# Patient Record
(demographics unavailable — no encounter records)

---

## 2025-06-17 NOTE — PHYSICAL EXAM
[Right] : right knee [] : no erythema [TWNoteComboBox7] : flexion 80 degrees [de-identified] : extension 5 degrees

## 2025-06-17 NOTE — HISTORY OF PRESENT ILLNESS
[de-identified] : Patient is a 51-year-old female here to evaluate right knee pain. Patient states pain is not related to any specific injury. Patient notes she started experiencing pain after sitting in the car for 10 minutes after going for a walk on a trail last week. Patient notes sharp, throbbing pain that occasionally radiates down the leg into the shin. Patient rates the pain a 9 out of 10. Patient notes pain is most prevalent when walking. Patient notes she can't bend the knee back when the leg is straightened for prolonged periods. Patient notes occasional swelling in the knee. Patient notes difficulty walking Patient uses Advil as needed. Patient notes no prior injury to the knee.

## 2025-06-17 NOTE — PHYSICAL EXAM
[Right] : right knee [] : no erythema [TWNoteComboBox7] : flexion 80 degrees [de-identified] : extension 5 degrees

## 2025-06-17 NOTE — HISTORY OF PRESENT ILLNESS
[de-identified] : Patient is a 51-year-old female here to evaluate right knee pain. Patient states pain is not related to any specific injury. Patient notes she started experiencing pain after sitting in the car for 10 minutes after going for a walk on a trail last week. Patient notes sharp, throbbing pain that occasionally radiates down the leg into the shin. Patient rates the pain a 9 out of 10. Patient notes pain is most prevalent when walking. Patient notes she can't bend the knee back when the leg is straightened for prolonged periods. Patient notes occasional swelling in the knee. Patient notes difficulty walking Patient uses Advil as needed. Patient notes no prior injury to the knee.

## 2025-06-17 NOTE — DISCUSSION/SUMMARY
[de-identified] : Tests/Studies Independently Interpreted Today    X-rays of right knee were obtained and reviewed on 06/10/2025 . 4 views were obtained including AP, lateral, sunrise and notch . I independently reviewed the films and the clinically relevant findings include calcified meniscus on both sides, slight patellofemoral degenerative changes. ______________________________________________________________________________________________ Meniscal tear vs early arthritis upon physical exam, discussed risks of potential meniscal surgery and risks of operative and non-operative treatment of arthritis. We will obtain an MRI to see if surgery is necessary and guide future treatment. In the interim, we discussed appropriate use of NSAIDs and side effects. Recommended rehab and discussed risks and benefits of possible injection.  Evaluated the patients right knee and decided to proceed with 9/2 right knee Celestone injection under u/s with aspiration. The risks, benefits and contents of the injection have been discussed. Risks include but are not limited to allergic reaction, flare reaction, permanent white skin discoloration at the injection site and infection.  The patient understands the risks and agrees to having the injection.  All questions have been answered. Discussed the timing of the injections and the follow up that is needed. Advised the patient to ice the area that was injected and that it may take a few days for the injection to provide relief. Discussed risks of potential surgery. However, due to the risks of the surgery, we will try NSAIDs and therapy. Discussed management of medication. Aspirated 30cc of cloudy fluid. Considering this, we will send fluids out to the lab to test for lime, gram stain, cell count, and uric acid  The patient will begin use of Hinged knee brace to ensure functional stability - fitted and dispensed in office today.  Prescribed patient Meloxicam 15mg daily and discussed risks of side effects, as well as timing/management of medication.  Side effects can include but are not limited to gastrointestinal ulcers and irritation, kidney failure, and bleeding issues. Use as directed and take with food to manage pain, inflammation, and discomfort.  Follow up after MRI

## 2025-06-17 NOTE — DISCUSSION/SUMMARY
[de-identified] : Tests/Studies Independently Interpreted Today    X-rays of right knee were obtained and reviewed on 06/10/2025 . 4 views were obtained including AP, lateral, sunrise and notch . I independently reviewed the films and the clinically relevant findings include calcified meniscus on both sides, slight patellofemoral degenerative changes. ______________________________________________________________________________________________ Meniscal tear vs early arthritis upon physical exam, discussed risks of potential meniscal surgery and risks of operative and non-operative treatment of arthritis. We will obtain an MRI to see if surgery is necessary and guide future treatment. In the interim, we discussed appropriate use of NSAIDs and side effects. Recommended rehab and discussed risks and benefits of possible injection.  Evaluated the patients right knee and decided to proceed with 9/2 right knee Celestone injection under u/s with aspiration. The risks, benefits and contents of the injection have been discussed. Risks include but are not limited to allergic reaction, flare reaction, permanent white skin discoloration at the injection site and infection.  The patient understands the risks and agrees to having the injection.  All questions have been answered. Discussed the timing of the injections and the follow up that is needed. Advised the patient to ice the area that was injected and that it may take a few days for the injection to provide relief. Discussed risks of potential surgery. However, due to the risks of the surgery, we will try NSAIDs and therapy. Discussed management of medication. Aspirated 30cc of cloudy fluid. Considering this, we will send fluids out to the lab to test for lime, gram stain, cell count, and uric acid  The patient will begin use of Hinged knee brace to ensure functional stability - fitted and dispensed in office today.  Prescribed patient Meloxicam 15mg daily and discussed risks of side effects, as well as timing/management of medication.  Side effects can include but are not limited to gastrointestinal ulcers and irritation, kidney failure, and bleeding issues. Use as directed and take with food to manage pain, inflammation, and discomfort.  Follow up after MRI

## 2025-06-17 NOTE — PROCEDURE
[FreeTextEntry3] : Procedure Note: Musculoskeletal Injection   Diagnosis:   Procedure: right knee superolateral injection  Indication:  The patient has had persistent pain despite conservative treatment.  Risks, benefits and alternatives to procedure were discussed; all questions were answered to the patient's apparent satisfaction and informed consent obtained.  The patient denied prior problems with local anesthetics, injectable cortisones, chicken allergy, coagulopathy and no relevant drug or preservative allergies or sensitivities.  The area of injection was prepared in a sterile fashion.  Prior to injection a 'Time Out' was conducted in accordance with [facility] policy and the site and nature of procedure verified with the patient.   Procedure:   The procedure was carried out utilizing sterile technique from a superolateral arthroscopic portal position.  The needle was placed under ultrasound guidance to improve accuracy and minimize risk to the patient and diagnostic ultrasound in the long and short axis revealed calcified meniscus.    Ultrasound Indication: Obesity   30cc of cloudy fluid was aspirated.   The specimen:   ( ) appeared benign and was discarded  (X) was sent for Cell Count / Gram Stain / Lime / Uric Acid.      Injection into the target area with care taken to aspirate frequently to minimize the risk of intravascular injection was performed with:  ( ) 1cc of Depomedrol (80mg/ml) (X) 2cc of Betamethasone (Celestone) (6mg/ml) ( ) 1cc of Toradol (30mg/ml) (X) 9cc of 0.5% Bupivacaine ( ) 1cc of 1% Lidocaine   Patient tolerated the procedure well and direct pressure was applied for hemostasis. The patient was reminded of potential post-injection risks including, but not limited to, delayed hypersensitivity reactions and/or infection.  The patient verified that they had the office and the Emergency Room's contact information if any problems should arise.  After several minutes, the patient informed me that they felt fine and was released from the office.

## 2025-06-17 NOTE — DISCUSSION/SUMMARY
[de-identified] : Tests/Studies Independently Interpreted Today    X-rays of right knee were obtained and reviewed on 06/10/2025 . 4 views were obtained including AP, lateral, sunrise and notch . I independently reviewed the films and the clinically relevant findings include calcified meniscus on both sides, slight patellofemoral degenerative changes. ______________________________________________________________________________________________ Meniscal tear vs early arthritis upon physical exam, discussed risks of potential meniscal surgery and risks of operative and non-operative treatment of arthritis. We will obtain an MRI to see if surgery is necessary and guide future treatment. In the interim, we discussed appropriate use of NSAIDs and side effects. Recommended rehab and discussed risks and benefits of possible injection.  Evaluated the patients right knee and decided to proceed with 9/2 right knee Celestone injection under u/s with aspiration. The risks, benefits and contents of the injection have been discussed. Risks include but are not limited to allergic reaction, flare reaction, permanent white skin discoloration at the injection site and infection.  The patient understands the risks and agrees to having the injection.  All questions have been answered. Discussed the timing of the injections and the follow up that is needed. Advised the patient to ice the area that was injected and that it may take a few days for the injection to provide relief. Discussed risks of potential surgery. However, due to the risks of the surgery, we will try NSAIDs and therapy. Discussed management of medication. Aspirated 30cc of cloudy fluid. Considering this, we will send fluids out to the lab to test for lime, gram stain, cell count, and uric acid  The patient will begin use of Hinged knee brace to ensure functional stability - fitted and dispensed in office today.  Prescribed patient Meloxicam 15mg daily and discussed risks of side effects, as well as timing/management of medication.  Side effects can include but are not limited to gastrointestinal ulcers and irritation, kidney failure, and bleeding issues. Use as directed and take with food to manage pain, inflammation, and discomfort.  Follow up after MRI

## 2025-06-17 NOTE — PROCEDURE
[FreeTextEntry3] : Procedure Note: Musculoskeletal Injection   Diagnosis:   Procedure: right knee superolateral injection  Indication:  The patient has had persistent pain despite conservative treatment.  Risks, benefits and alternatives to procedure were discussed; all questions were answered to the patient's apparent satisfaction and informed consent obtained.  The patient denied prior problems with local anesthetics, injectable cortisones, chicken allergy, coagulopathy and no relevant drug or preservative allergies or sensitivities.  The area of injection was prepared in a sterile fashion.  Prior to injection a 'Time Out' was conducted in accordance with [facility] policy and the site and nature of procedure verified with the patient.   Procedure:   The procedure was carried out utilizing sterile technique from a superolateral arthroscopic portal position.  The needle was placed under ultrasound guidance to improve accuracy and minimize risk to the patient and diagnostic ultrasound in the long and short axis revealed calcified meniscus.    Ultrasound Indication: Obesity   30cc of cloudy fluid was aspirated.   The specimen:   ( ) appeared benign and was discarded  (X) was sent for Cell Count / Gram Stain / Lime / Uric Acid.      Injection into the target area with care taken to aspirate frequently to minimize the risk of intravascular injection was performed with:  ( ) 1cc of Depomedrol (80mg/ml) (X) 2cc of Betamethasone (Celestone) (6mg/ml) ( ) 1cc of Toradol (30mg/ml) (X) 9cc of 0.5% Bupivacaine ( ) 1cc of 1% Lidocaine   Patient tolerated the procedure well and direct pressure was applied for hemostasis. The patient was reminded of potential post-injection risks including, but not limited to, delayed hypersensitivity reactions and/or infection.  The patient verified that they had the office and the Emergency Room's contact information if any problems should arise.  After several minutes, the patient informed me that they felt fine and was released from the office.

## 2025-06-17 NOTE — PHYSICAL EXAM
[Right] : right knee [] : no erythema [TWNoteComboBox7] : flexion 80 degrees [de-identified] : extension 5 degrees

## 2025-06-25 NOTE — DATA REVIEWED
[MRI] : MRI [Right] : of the right [Knee] : knee [Report was reviewed and noted in the chart] : The report was reviewed and noted in the chart [I independently reviewed and interpreted images and report] : I independently reviewed and interpreted images and report [I reviewed the films/CD] : I reviewed the films/CD [FreeTextEntry1] :  free edge tear of the lateral meniscus

## 2025-06-25 NOTE — HISTORY OF PRESENT ILLNESS
[de-identified] : 06/17/25: patient is here for follow up right knee pain. has MRI done. pain has improved after CSI last visit. pain is 2/10. [FreeTextEntry1] : Right knee

## 2025-06-25 NOTE — DISCUSSION/SUMMARY
[de-identified] : The patient's condition is acute Confounding medical conditions/concerns: Tests/Studies Independently Interpreted Today We reviewed MRI findings of the R knee and found slight free edge tear of the lateral meniscus  ------------------------------------------------------------------------------------------------------------------  We reviewed the mri findings of the R knee and discussed treatment options, both operative and non operative. Discussed risks of potential surgery. However, due to the risks of the surgery, we will try NSAIDs and therapy. Discussed management of medication. Surgery has been suggested a finite treatment. The risks and benefits of surgery have been discussed. Risks include but are not limited to bleeding, infection, reaction to anesthesia, injury to blood vessels and nerves, malunion, nonunion, DVT, PE, necessity of repeat surgery, chronic pain, loss of limb and death. The patient understands the risks and has chosen to proceed with conservative care. All questions have been answered.  Prescribed patient Meloxicam 15mg once daily and discussed risks of side effects, as well as timing/management of medication.  Side effects can include but are not limited to gastrointestinal ulcers and irritation, kidney failure, and bleeding issues. Use as directed and take with food to manage pain, inflammation, and discomfort.  Reviewed lab results and found calcium crystals in fluid, lime results were negative, fluid results were negative. Advised the patient this is likely pseudogout. Advised her to treat with antiinflammatory meloxicam.   In regards to the lateral meniscus tear, advised the pt to avoid surgery unless the anterior of the knee begins to hurt.    Pt was prescribed PT 2-3x a week for 4-6 weeks  [facility]

## 2025-06-25 NOTE — DISCUSSION/SUMMARY
[de-identified] : The patient's condition is acute Confounding medical conditions/concerns: Tests/Studies Independently Interpreted Today We reviewed MRI findings of the R knee and found slight free edge tear of the lateral meniscus  ------------------------------------------------------------------------------------------------------------------  We reviewed the mri findings of the R knee and discussed treatment options, both operative and non operative. Discussed risks of potential surgery. However, due to the risks of the surgery, we will try NSAIDs and therapy. Discussed management of medication. Surgery has been suggested a finite treatment. The risks and benefits of surgery have been discussed. Risks include but are not limited to bleeding, infection, reaction to anesthesia, injury to blood vessels and nerves, malunion, nonunion, DVT, PE, necessity of repeat surgery, chronic pain, loss of limb and death. The patient understands the risks and has chosen to proceed with conservative care. All questions have been answered.  Prescribed patient Meloxicam 15mg once daily and discussed risks of side effects, as well as timing/management of medication.  Side effects can include but are not limited to gastrointestinal ulcers and irritation, kidney failure, and bleeding issues. Use as directed and take with food to manage pain, inflammation, and discomfort.  Reviewed lab results and found calcium crystals in fluid, lime results were negative, fluid results were negative. Advised the patient this is likely pseudogout. Advised her to treat with antiinflammatory meloxicam.   In regards to the lateral meniscus tear, advised the pt to avoid surgery unless the anterior of the knee begins to hurt.    Pt was prescribed PT 2-3x a week for 4-6 weeks  [facility]

## 2025-06-25 NOTE — PHYSICAL EXAM
[NL (140)] : flexion 140 degrees [NL (0)] : extension 0 degrees [Negative] : negative Pieter's [] : no atrophy

## 2025-06-25 NOTE — HISTORY OF PRESENT ILLNESS
[de-identified] : 06/17/25: patient is here for follow up right knee pain. has MRI done. pain has improved after CSI last visit. pain is 2/10. [FreeTextEntry1] : Right knee